# Patient Record
Sex: MALE | Race: WHITE | NOT HISPANIC OR LATINO | Employment: PART TIME | URBAN - METROPOLITAN AREA
[De-identification: names, ages, dates, MRNs, and addresses within clinical notes are randomized per-mention and may not be internally consistent; named-entity substitution may affect disease eponyms.]

---

## 2017-04-20 ENCOUNTER — APPOINTMENT (OUTPATIENT)
Dept: LAB | Facility: CLINIC | Age: 67
End: 2017-04-20
Payer: COMMERCIAL

## 2017-04-20 ENCOUNTER — TRANSCRIBE ORDERS (OUTPATIENT)
Dept: LAB | Facility: CLINIC | Age: 67
End: 2017-04-20

## 2017-04-20 DIAGNOSIS — Z13.29 SCREENING FOR THYROID DISORDER: ICD-10-CM

## 2017-04-20 DIAGNOSIS — Z13.6 SCREENING FOR ISCHEMIC HEART DISEASE: Primary | ICD-10-CM

## 2017-04-20 DIAGNOSIS — E78.2 MIXED HYPERLIPIDEMIA: ICD-10-CM

## 2017-04-20 DIAGNOSIS — Z12.5 SPECIAL SCREENING FOR MALIGNANT NEOPLASM OF PROSTATE: ICD-10-CM

## 2017-04-20 DIAGNOSIS — Z12.11 SPECIAL SCREENING FOR MALIGNANT NEOPLASMS, COLON: ICD-10-CM

## 2017-04-20 LAB
ALBUMIN SERPL BCP-MCNC: 3.3 G/DL (ref 3.5–5)
ALP SERPL-CCNC: 55 U/L (ref 46–116)
ALT SERPL W P-5'-P-CCNC: 31 U/L (ref 12–78)
ANION GAP SERPL CALCULATED.3IONS-SCNC: 5 MMOL/L (ref 4–13)
AST SERPL W P-5'-P-CCNC: 22 U/L (ref 5–45)
BASOPHILS # BLD AUTO: 0.04 THOUSANDS/ΜL (ref 0–0.1)
BASOPHILS NFR BLD AUTO: 1 % (ref 0–1)
BILIRUB SERPL-MCNC: 0.88 MG/DL (ref 0.2–1)
BUN SERPL-MCNC: 19 MG/DL (ref 5–25)
CALCIUM SERPL-MCNC: 9 MG/DL (ref 8.3–10.1)
CHLORIDE SERPL-SCNC: 106 MMOL/L (ref 100–108)
CHOLEST SERPL-MCNC: 161 MG/DL (ref 50–200)
CO2 SERPL-SCNC: 30 MMOL/L (ref 21–32)
CREAT SERPL-MCNC: 1.03 MG/DL (ref 0.6–1.3)
EOSINOPHIL # BLD AUTO: 0.42 THOUSAND/ΜL (ref 0–0.61)
EOSINOPHIL NFR BLD AUTO: 7 % (ref 0–6)
ERYTHROCYTE [DISTWIDTH] IN BLOOD BY AUTOMATED COUNT: 13.1 % (ref 11.6–15.1)
GFR SERPL CREATININE-BSD FRML MDRD: >60 ML/MIN/1.73SQ M
GLUCOSE P FAST SERPL-MCNC: 90 MG/DL (ref 65–99)
HCT VFR BLD AUTO: 41.1 % (ref 36.5–49.3)
HDLC SERPL-MCNC: 35 MG/DL (ref 40–60)
HGB BLD-MCNC: 14.1 G/DL (ref 12–17)
LDLC SERPL CALC-MCNC: 99 MG/DL (ref 0–100)
LYMPHOCYTES # BLD AUTO: 1.82 THOUSANDS/ΜL (ref 0.6–4.47)
LYMPHOCYTES NFR BLD AUTO: 30 % (ref 14–44)
MCH RBC QN AUTO: 32.8 PG (ref 26.8–34.3)
MCHC RBC AUTO-ENTMCNC: 34.3 G/DL (ref 31.4–37.4)
MCV RBC AUTO: 96 FL (ref 82–98)
MONOCYTES # BLD AUTO: 0.71 THOUSAND/ΜL (ref 0.17–1.22)
MONOCYTES NFR BLD AUTO: 12 % (ref 4–12)
NEUTROPHILS # BLD AUTO: 2.99 THOUSANDS/ΜL (ref 1.85–7.62)
NEUTS SEG NFR BLD AUTO: 50 % (ref 43–75)
NRBC BLD AUTO-RTO: 0 /100 WBCS
PLATELET # BLD AUTO: 171 THOUSANDS/UL (ref 149–390)
PMV BLD AUTO: 12.2 FL (ref 8.9–12.7)
POTASSIUM SERPL-SCNC: 4.4 MMOL/L (ref 3.5–5.3)
PROT SERPL-MCNC: 7.1 G/DL (ref 6.4–8.2)
PSA SERPL-MCNC: 1.7 NG/ML (ref 0–4)
RBC # BLD AUTO: 4.3 MILLION/UL (ref 3.88–5.62)
SODIUM SERPL-SCNC: 141 MMOL/L (ref 136–145)
TRIGL SERPL-MCNC: 133 MG/DL
WBC # BLD AUTO: 6 THOUSAND/UL (ref 4.31–10.16)

## 2017-04-20 PROCEDURE — G0103 PSA SCREENING: HCPCS

## 2017-04-20 PROCEDURE — 85025 COMPLETE CBC W/AUTO DIFF WBC: CPT

## 2017-04-20 PROCEDURE — 80061 LIPID PANEL: CPT

## 2017-04-20 PROCEDURE — 80053 COMPREHEN METABOLIC PANEL: CPT

## 2017-04-20 PROCEDURE — 36415 COLL VENOUS BLD VENIPUNCTURE: CPT

## 2017-04-24 ENCOUNTER — GENERIC CONVERSION - ENCOUNTER (OUTPATIENT)
Dept: OTHER | Facility: OTHER | Age: 67
End: 2017-04-24

## 2017-05-01 ENCOUNTER — ALLSCRIPTS OFFICE VISIT (OUTPATIENT)
Dept: OTHER | Facility: OTHER | Age: 67
End: 2017-05-01

## 2017-05-01 LAB — OCCULT BLD, FECAL IMMUNOLOGICAL (HISTORICAL): NEGATIVE

## 2017-08-31 ENCOUNTER — ALLSCRIPTS OFFICE VISIT (OUTPATIENT)
Dept: OTHER | Facility: OTHER | Age: 67
End: 2017-08-31

## 2018-01-10 NOTE — RESULT NOTES
Discussion/Summary   will discuss labs at follow  up appt     Verified Results  (1) CBC/PLT/DIFF 20Apr2017 07:15AM Tracy Contreras     Test Name Result Flag Reference   WBC COUNT 6 00 Thousand/uL  4 31-10 16   RBC COUNT 4 30 Million/uL  3 88-5 62   HEMOGLOBIN 14 1 g/dL  12 0-17 0   HEMATOCRIT 41 1 %  36 5-49 3   MCV 96 fL  82-98   MCH 32 8 pg  26 8-34 3   MCHC 34 3 g/dL  31 4-37 4   RDW 13 1 %  11 6-15 1   MPV 12 2 fL  8 9-12 7   PLATELET COUNT 167 Thousands/uL  149-390   nRBC AUTOMATED 0 /100 WBCs     NEUTROPHILS RELATIVE PERCENT 50 %  43-75   LYMPHOCYTES RELATIVE PERCENT 30 %  14-44   MONOCYTES RELATIVE PERCENT 12 %  4-12   EOSINOPHILS RELATIVE PERCENT 7 % H 0-6   BASOPHILS RELATIVE PERCENT 1 %  0-1   NEUTROPHILS ABSOLUTE COUNT 2 99 Thousands/? ??L  1 85-7 62   LYMPHOCYTES ABSOLUTE COUNT 1 82 Thousands/? ??L  0 60-4 47   MONOCYTES ABSOLUTE COUNT 0 71 Thousand/? ??L  0 17-1 22   EOSINOPHILS ABSOLUTE COUNT 0 42 Thousand/? ??L  0 00-0 61   BASOPHILS ABSOLUTE COUNT 0 04 Thousands/? ??L  0 00-0 10   This bloodwork is non-fasting  Please drink two glasses of water morning of  bloodwork  (1) COMPREHENSIVE METABOLIC PANEL [id number] 95:46FN Tracy Contreras     Test Name Result Flag Reference   SODIUM 141 mmol/L  136-145   POTASSIUM 4 4 mmol/L  3 5-5 3   Slightly Hemolyzed; Results May be Affected   CHLORIDE 106 mmol/L  100-108   CARBON DIOXIDE 30 mmol/L  21-32   ANION GAP (CALC) 5 mmol/L  4-13   BLOOD UREA NITROGEN 19 mg/dL  5-25   CREATININE 1 03 mg/dL  0 60-1 30   Standardized to IDMS reference method   CALCIUM 9 0 mg/dL  8 3-10 1   BILI, TOTAL 0 88 mg/dL  0 20-1 00   ALK PHOSPHATAS 55 U/L     ALT (SGPT) 31 U/L  12-78   AST(SGOT) 22 U/L  5-45   Slightly Hemolyzed;  Results May be Affected   ALBUMIN 3 3 g/dL L 3 5-5 0   TOTAL PROTEIN 7 1 g/dL  6 4-8 2   eGFR Non-African American      >60 0 ml/min/1 73sq m   [employer] Disease Education Program recommendations are as follows:  GFR calculation is accurate only with a steady state creatinine  Chronic Kidney disease less than 60 ml/min/1 73 sq  meters  Kidney failure less than 15 ml/min/1 73 sq  meters  GLUCOSE FASTING 90 mg/dL  65-99     (1) LIPID PANEL, FASTING 20Apr2017 07:15AM Bradley Rangel     Test Name Result Flag Reference   CHOLESTEROL 161 mg/dL     HDL,DIRECT 35 mg/dL L 40-60   Specimen collection should occur prior to Metamizole administration due to the potential for falsely depressed results  LDL CHOLESTEROL CALCULATED 99 mg/dL  0-100   This is a fasting blood test  Water,black tea or black  coffee only after 9:00pm the night before test  Drink 2 glasses of water the morning of test         Triglyceride:         Normal              <150 mg/dl       Borderline High    150-199 mg/dl       High               200-499 mg/dl       Very High          >499 mg/dl  Cholesterol:         Desirable        <200 mg/dl      Borderline High  200-239 mg/dl      High             >239 mg/dl  HDL Cholesterol:        High    >59 mg/dL      Low     <41 mg/dL  LDL CALCULATED:    This screening LDL is a calculated result  It does not have the accuracy of the Direct Measured LDL in the monitoring of patients with hyperlipidemia and/or statin therapy  Direct Measure LDL (MJU180) must be ordered separately in these patients  TRIGLYCERIDES 133 mg/dL  <=150   Specimen collection should occur prior to N-Acetylcysteine or Metamizole administration due to the potential for falsely depressed results  (1) PSA (SCREEN) (Dx V76 44 Screen for Prostate Cancer) 20Apr2017 07:15AM Bradley Rangel     Test Name Result Flag Reference   PROSTATE SPECIFIC ANTIGEN 1 7 ng/mL  0 0-4 0   American Urological Association Guidelines define biochemical recurrence of prostate cancer as a detectable or rising PSA value post-radical prostatectomy that is greater than or equal to 0 2 ng/mL with a second confirmatory level of greater than or equal to 0 2 ng/mL  This bloodwork is non-fasting   Please drink two glasses of water morning of  bloodwork

## 2018-01-10 NOTE — RESULT NOTES
Verified Results  (1) CBC/PLT/DIFF 12Apr2016 07:18AM [email or web address]     Test Name Result Flag Reference   WHITE BLOOD CELL COUNT 6 5 Thousand/uL  3 8-10 8   RED BLOOD CELL COUNT 4 61 Million/uL  4 20-5 80   HEMOGLOBIN 14 4 g/dL  13 2-17 1   HEMATOCRIT 45 0 %  38 5-50 0   MCV 97 6 fL  80 0-100 0   MCH 31 3 pg  27 0-33 0   MCHC 32 1 g/dL  32 0-36 0   RDW 13 1 %  11 0-15 0   PLATELET COUNT 837 Thousand/uL  140-400   MPV 11 0 fL  7 5-11 5   ABSOLUTE NEUTROPHILS 3543 cells/uL  8362-9829   ABSOLUTE LYMPHOCYTES 1755 cells/uL  850-3900   ABSOLUTE MONOCYTES 670 cells/uL  200-950   ABSOLUTE EOSINOPHILS 514 cells/uL H    ABSOLUTE BASOPHILS 20 cells/uL  0-200   NEUTROPHILS 54 5 %     LYMPHOCYTES 27 0 %     MONOCYTES 10 3 %     EOSINOPHILS 7 9 %     BASOPHILS 0 3 %       (1) COMPREHENSIVE METABOLIC PANEL [id number] 66:11KP [email or web address]     Test Name Result Flag Reference   GLUCOSE 95 mg/dL  65-99   Fasting reference interval   UREA NITROGEN (BUN) 21 mg/dL  7-25   CREATININE 1 02 mg/dL  0 70-1 25   For patients >52years of age, the reference limit  for Creatinine is approximately 13% higher for people  identified as -American  eGFR NON-AFR   AMERICAN 77 mL/min/1 73m2  > OR = 60   eGFR AFRICAN AMERICAN 90 mL/min/1 73m2  > OR = 60   BUN/CREATININE RATIO   1-02   NOT APPLICABLE (calc)   SODIUM 142 mmol/L  135-146   POTASSIUM 4 8 mmol/L  3 5-5 3   CHLORIDE 107 mmol/L     CARBON DIOXIDE 23 mmol/L  19-30   CALCIUM 9 1 mg/dL  8 6-10 3   PROTEIN, TOTAL 6 8 g/dL  6 1-8 1   ALBUMIN 4 2 g/dL  3 6-5 1   GLOBULIN 2 6 g/dL (calc)  1 9-3 7   ALBUMIN/GLOBULIN RATIO 1 6 (calc)  1 0-2 5   BILIRUBIN, TOTAL 0 6 mg/dL  0 2-1 2   ALKALINE PHOSPHATASE 49 U/L     AST 18 U/L  10-35   ALT 24 U/L  9-46     (1) LIPID PANEL, FASTING 12Apr2016 07:18AM [email or web address]     Test Name Result Flag Reference   CHOLESTEROL, TOTAL 141 mg/dL  125-200   HDL CHOLESTEROL 30 mg/dL L > OR = 40   TRIGLICERIDES 96 mg/dL  <505   LDL-CHOLESTEROL 92 mg/dL (calc)  <130   Desirable range <100 mg/dL for patients with CHD or  diabetes and <70 mg/dL for diabetic patients with  known heart disease  CHOL/HDLC RATIO 4 7 (calc)  < OR = 5 0   NON HDL CHOLESTEROL 111 mg/dL (calc)     Target for non-HDL cholesterol is 30 mg/dL higher than   LDL cholesterol target  (1) PSA (SCREEN) (Dx V76 44 Screen for Prostate Cancer) 12Apr2016 07:18AM [email or web address]     Test Name Result Flag Reference   PSA, TOTAL 1 7 ng/mL  < OR = 4 0   This test was performed using the Siemens  chemiluminescent method  Values obtained from  different assay methods cannot be used  interchangeably  PSA levels, regardless of  value, should not be interpreted as absolute  evidence of the presence or absence of disease       (Q) TSH, 3RD GENERATION 12Apr2016 07:18AM [email or web address]     Test Name Result Flag Reference   TSH 1 92 mIU/L  0 40-4 50       Discussion/Summary   labs acceptable

## 2018-01-13 VITALS
SYSTOLIC BLOOD PRESSURE: 136 MMHG | HEIGHT: 69 IN | DIASTOLIC BLOOD PRESSURE: 84 MMHG | BODY MASS INDEX: 26.96 KG/M2 | TEMPERATURE: 97 F | HEART RATE: 76 BPM | RESPIRATION RATE: 16 BRPM | WEIGHT: 182 LBS

## 2018-01-14 VITALS
BODY MASS INDEX: 28.88 KG/M2 | DIASTOLIC BLOOD PRESSURE: 74 MMHG | SYSTOLIC BLOOD PRESSURE: 114 MMHG | TEMPERATURE: 97 F | HEIGHT: 69 IN | RESPIRATION RATE: 16 BRPM | WEIGHT: 195 LBS | HEART RATE: 76 BPM

## 2018-01-16 NOTE — PROGRESS NOTES
Assessment    1  Encounter for preventive health examination (V70 0) (Z00 00)   2  Encounter for screening for cardiovascular disorders (V81 2) (Z13 6)   3  Encounter for screening for malignant neoplasm of prostate (V76 44) (Z12 5)   4  Screening for hypothyroidism (V77 0) (Z13 29)   5  Encounter for screening for malignant neoplasm of colon (V76 51) (Z12 11)   6  Asthma (493 90) (J45 909)   7  Acid reflux disease (530 81) (K21 9)   8  Mixed hyperlipidemia (272 2) (E78 2)   9  BMI 28 0-28 9,adult (V85 24) (Z68 28)    Plan  Acid reflux disease    · Omeprazole 40 MG Oral Capsule Delayed Release; take 1 capsule by mouth  once daily  Asthma    · ProAir  (90 Base) MCG/ACT Inhalation Aerosol Solution; INHALE 1-2  PUFFS EVERY 4-6 HOURS AS NEEDED AND AS DIRECTED  Encounter for screening for cardiovascular disorders, Encounter for screening for  malignant neoplasm of prostate, Screening for hypothyroidism    · (1) CBC/PLT/DIFF; Status:Active; Requested for:07Apr2016;    · (1) COMPREHENSIVE METABOLIC PANEL; Status:Active; Requested for:07Apr2016;    · (1) LIPID PANEL, FASTING; Status:Active; Requested for:07Apr2016;    · (1) PSA (SCREEN) (Dx V76 44 Screen for Prostate Cancer); Status:Active; Requested  for:07Apr2016;    · (1) TSH; Status:Active; Requested for:07Apr2016;   Encounter for screening for malignant neoplasm of colon    · COLONOSCOPY; Status:Active; Requested for:07Apr2016;    · 1 - Mahad King MD, Charmaine Alaniz (Gastroenterology) Physician Referral  Consult  Status: Active   Requested for: 07Apr2016  Care Summary provided  : Yes  Health Maintenance    · DIGITAL RECTAL EXAM; Status:Complete;   Done: 62WNC9801 11:25AM   · Hemoccult Screening - POC; Status:Complete;   Done: 98APR6333 11:25AM   · Call [phone] if: You have any warning signs of skin cancer ; Status:Complete;    Done: 26VYE8483 11:29AM   · Call 911 if:  You experience a new kind of chest pain (angina) or pressure ;  Status:Complete;   Done: 39UII4064 11:29AM   · Begin a limited exercise program ; Status:Complete;   Done: 10RBZ8284 11:29AM   · Decreasing the stress in your life may help your condition improve ; Status:Complete;    Done: 07Apr2016 11:29AM   · Diets that are low in carbohydrates and high in protein are very popular for weight loss ;  Status:Complete;   Done: 78Zwq4346 11:29AM   · Regular aerobic exercise can help reduce stress ; Status:Complete;   Done: 04MMI7168  11:29AM   · The plan of care for falls is detailed in the plan and/or discussion section of today's note ;  Status:Complete;   Done: 70QVG1673 11:29AM   · The plan of care for urinary incontinence is detailed in the plan and/or discussion section  of today's note ; Status:Complete;   Done: 90MLW4313 11:29AM   · There are many exercise options for seniors ; Status:Complete;   Done: 61SWA7717  11:29AM   · There ways to avoid falling ; Status:Complete;   Done: 07Apr2016 11:29AM   · These are things you can do to prevent falls in and around the home ; Status:Complete;    Done: 15Iam5971 11:29AM  Mixed hyperlipidemia    · Lipitor 20 MG Oral Tablet (Atorvastatin Calcium); Take 1 tablet daily  Unlinked    · Aspirin 81 MG Oral Tablet; Take 1 tablet daily    Discussion/Summary  Screening lab work includes glucose and lipid profile  Chief Complaint  cpe      History of Present Illness  HM, Adult Male: The patient is being seen for a health maintenance evaluation  General Health:   Screening:   HPI: pt is here for a full physical         Review of Systems    Constitutional: No fever or chills, feels well, no tiredness, no recent weight gain or weight loss  Eyes: No complaints of eye pain, no red eyes, no discharge from eyes, no itchy eyes  ENT: no complaints of earache, no hearing loss, no nosebleeds, no nasal discharge, no sore throat, no hoarseness     Cardiovascular: No complaints of slow heart rate, no fast heart rate, no chest pain, no palpitations, no leg claudication, no lower extremity  Respiratory: No complaints of shortness of breath, no wheezing, no cough, no SOB on exertion, no orthopnea or PND  Gastrointestinal: No complaints of abdominal pain, no constipation, no nausea or vomiting, no diarrhea or bloody stools  Genitourinary: No complaints of dysuria, no incontinence, no hesitancy, no nocturia, no genital lesion, no testicular pain  Musculoskeletal: No complaints of arthralgia, no myalgias, no joint swelling or stiffness, no limb pain or swelling  Integumentary: No complaints of skin rash or skin lesions, no itching, no skin wound, no dry skin  Neurological: No compliants of headache, no confusion, no convulsions, no numbness or tingling, no dizziness or fainting, no limb weakness, no difficulty walking  Psychiatric: Is not suicidal, no sleep disturbances, no anxiety or depression, no change in personality, no emotional problems  Endocrine: No complaints of proptosis, no hot flashes, no muscle weakness, no erectile dysfunction, no deepening of the voice, no feelings of weakness  Hematologic/Lymphatic: No complaints of swollen glands, no swollen glands in the neck, does not bleed easily, no easy bruising  Active Problems    1  Acid reflux disease (530 81) (K21 9)   2  Allergic rhinitis (477 9) (J30 9)   3  Asthma (493 90) (J45 909)   4  Encounter for screening for malignant neoplasm of colon (V76 51) (Z12 11)   5   Mixed hyperlipidemia (272 2) (E78 2)    Past Medical History    · History of acute sinusitis (V12 69) (Z87 09)   · 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)    Surgical History    · History of Surgery Scrotum Excision Of Lesion    Family History    · Family history of cerebrovascular accident (V17 1) (Z82 3)   · Family history of tuberculosis (V18 8) (Z83 1)   · Family history of Throat cancer   · Family history of Tobacco abuse    · Family history of Angina at rest   · Family history of Emphysema/COPD   · Family history of Tobacco abuse    Social History    · Former smoker (B29 21) (M77 484)    Current Meds   1  Advil 200 MG Oral Tablet; Take 1 tablet 3-4 times daily as needed Recorded   2  Aspirin 81 MG Oral Tablet; Take 1 tablet daily Recorded   3  Claritin 10 MG Oral Tablet; TAKE 1 TABLET EVERY MORNING AS NEEDED; Therapy: (Recorded:31Mar2015) to Recorded   4  Fish Oil 1000 MG Oral Capsule; 1 TAB THREE TIMES DAILY; Therapy: (Recorded:31Mar2015) to Recorded   5  Lipitor 20 MG Oral Tablet; Take 1 tablet daily; Therapy: (Recorded:07Apr2016) to Recorded   6  Omeprazole 40 MG Oral Capsule Delayed Release; take 1 capsule by mouth once daily; Therapy: 84DOU9176 to (Evaluate:22Jsp1574)  Requested for: 92JMX2560; Last   Rx:02Nov2015 Ordered   7  ProAir  (90 Base) MCG/ACT Inhalation Aerosol Solution; INHALE 1-2 PUFFS   EVERY 4-6 HOURS AS NEEDED AND AS DIRECTED; Therapy: 48ZTZ7501 to (Evaluate:30Apr2015)  Requested for: 89VDT5638; Last   Rx:31Mar2015 Ordered    Allergies    1  No Known Drug Allergies    Vitals   Recorded: 07Apr2016 11:00AM   Temperature 96 7 F   Heart Rate 84   Respiration 16   Systolic 024   Diastolic 84   Height 5 ft 9 in   Weight 193 lb    BMI Calculated 28 5   BSA Calculated 2 03     Physical Exam    Constitutional   General appearance: No acute distress, well appearing and well nourished  Eyes   Conjunctiva and lids: No erythema, swelling or discharge  Pupils and irises: Equal, round, reactive to light  Ophthalmoscopic examination: Normal fundi and optic discs  Ears, Nose, Mouth, and Throat   External inspection of ears and nose: Normal     Otoscopic examination: Tympanic membranes translucent with normal light reflex  Canals patent without erythema  Hearing: Normal     Nasal mucosa, septum, and turbinates: Normal without edema or erythema  Lips, teeth, and gums: Normal, good dentition  Oropharynx: Normal with no erythema, edema, exudate or lesions      Neck   Neck: Supple, symmetric, trachea midline, no masses  Thyroid: Normal, no thyromegaly  Pulmonary   Respiratory effort: No increased work of breathing or signs of respiratory distress  Percussion of chest: Normal     Palpation of chest: Normal     Auscultation of lungs: Clear to auscultation  Cardiovascular   Palpation of heart: Normal PMI, no thrills  Auscultation of heart: Normal rate and rhythm, normal S1 and S2, no murmurs  Carotid pulses: 2+ bilaterally  Abdominal aorta: Normal     Femoral pulses: 2+ bilaterally  Pedal pulses: 2+ bilaterally  Examination of extremities for edema and/or varicosities: Normal     Chest   Breasts: Normal, no dimpling or skin changes appreciated  Palpation of breasts and axillae: Normal, no masses palpated  Chest: Normal     Abdomen   Abdomen: Non-tender, no masses  Liver and spleen: No hepatomegaly or splenomegaly  Examination for hernias: No hernias appreciated  Anus, perineum, and rectum: Normal sphincter tone, no masses, no prolapse  Stool sample for occult blood: Negative  Genitourinary   Scrotal contents: Normal testes, no masses  Penis: Normal, no lesions  Digital rectal exam of prostate: Normal size, no masses  Lymphatic   Palpation of lymph nodes in neck: No lymphadenopathy  Palpation of lymph nodes in axillae: No lymphadenopathy  Palpation of lymph nodes in groin: No lymphadenopathy  Palpation of lymph nodes in other areas: No lymphadenopathy  Musculoskeletal   Gait and station: Normal     Inspection/palpation of digits and nails: Normal without clubbing or cyanosis  Inspection/palpation of joints, bones, and muscles: Normal     Range of motion: Normal     Stability: Normal     Muscle strength/tone: Normal     Skin   Skin and subcutaneous tissue: Normal without rashes or lesions  Palpation of skin and subcutaneous tissue: Normal turgor  Neurologic   Cranial nerves: Cranial nerves 2-12 intact      Reflexes: 2+ and symmetric  Sensation: No sensory loss  Psychiatric   Judgment and insight: Normal     Orientation to person, place and time: Normal     Recent and remote memory: Intact  Mood and affect: Normal        Procedure    Procedure: Indication: routine screening  Pt forgot glasses   Color vision was and the results were normal       Signatures   Electronically signed by : Bob Kim DO;  Apr 7 2016 11:30AM EST                       (Author)

## 2018-01-16 NOTE — PROGRESS NOTES
Assessment    1  Encounter for preventive health examination (V70 0) (Z00 00)   2  Screening for colorectal cancer (V76 51) (Z12 11,Z12 12)   3  GERD without esophagitis (530 81) (K21 9)   4  Mixed hyperlipidemia (272 2) (E78 2)   5  Former smoker (V15 82) (K43 184)    Plan  Congenital pes planus of right foot    · Lyrica 75 MG Oral Capsule   · Meloxicam 7 5 MG Oral Tablet  GERD without esophagitis    · Omeprazole 40 MG Oral Capsule Delayed Release; TAKE 1 [address] [facility]    · DIGITAL RECTAL EXAM; Status:Complete;   Done: 67GNI3131 03:59PM   · Hemoccult Screening - POC; Status:Complete;   Done: 94SMK4545 03:59PM  Mixed hyperlipidemia    · Atorvastatin Calcium 20 MG Oral Tablet (Lipitor); Take 1 tablet daily   · Fish Oil 1000 MG Oral Capsule; 1 TAB THREE TIMES DAILY   · (1) COMPREHENSIVE METABOLIC PANEL; Status:Active; Requested for:01Nov2017;    · (1) LIPID PANEL, FASTING; Status:Active; Requested for:01Nov2017;    · Follow-up visit in 6 months Evaluation and Treatment  Follow-up  Status: Hold For -  Scheduling  Requested for: 13ELE1143  PMH: History of asthma    · ProAir  (90 Base) MCG/ACT Inhalation Aerosol Solution  PMH: Right foot pain    · Gabapentin 300 MG Oral Capsule  PMH: Screening for genitourinary condition    · *VB - Urinary Incontinence Screen (Dx Z13 89 Screen for UI); Status:Complete;   Done:  05SKQ0698 03:40PM  Screening for colorectal cancer    · (1) COLOGUARD; Status:Active; Requested for:30Bmn7938;   cont  : I authorize [address] to obtain reimbursement for      Cologuard and to directly contact and collect a second sample from the [facility]      if reportable results are not obtained from the initial sample   cont  : I accept responsibility for maintaining the privacy of test results and      related information as required by HIPAA   : By ordering Cologuard, I certify that I am a licensed medical professional      authorized to order Cologuard   I acknowledge that the test is medically necessary and      that the patient is eligible to use Cologuard  Unlinked    · Aspirin 81 MG TABS; Take 1 tablet daily    Discussion/Summary  Impression: health maintenance visit  Prostate cancer screening: prostate cancer screening is current  Testicular cancer screening: clinical testicular exam was done today  Colorectal cancer screening: cologuard ordered  Chief Complaint  CPE,      History of Present Illness  HM, Adult Male: The patient is being seen for a health maintenance evaluation  General Health:   Screening:   HPI: pt is here for a full physical    pt states his rt foot bothers him sees podiatry has a pes planus and some arthritis    last colon was 12 years ago    pt has a history of asthma but it has not bothered him      Review of Systems    Constitutional: No fever or chills, feels well, no tiredness, no recent weight gain or weight loss  Eyes: No complaints of eye pain, no red eyes, no discharge from eyes, no itchy eyes  ENT: no complaints of earache, no hearing loss, no nosebleeds, no nasal discharge, no sore throat, no hoarseness  Cardiovascular: No complaints of slow heart rate, no fast heart rate, no chest pain, no palpitations, no leg claudication, no lower extremity  Respiratory: No complaints of shortness of breath, no wheezing, no cough, no SOB on exertion, no orthopnea or PND  Gastrointestinal: No complaints of abdominal pain, no constipation, no nausea or vomiting, no diarrhea or bloody stools  Genitourinary: No complaints of dysuria, no incontinence, no hesitancy, no nocturia, no genital lesion, no testicular pain  Musculoskeletal: No complaints of arthralgia, no myalgias, no joint swelling or stiffness, no limb pain or swelling  Integumentary: No complaints of skin rash or skin lesions, no itching, no skin wound, no dry skin     Neurological: No compliants of headache, no confusion, no convulsions, no numbness or tingling, no dizziness or fainting, no limb weakness, no difficulty walking  Psychiatric: Is not suicidal, no sleep disturbances, no anxiety or depression, no change in personality, no emotional problems  Endocrine: No complaints of proptosis, no hot flashes, no muscle weakness, no erectile dysfunction, no deepening of the voice, no feelings of weakness  Hematologic/Lymphatic: No complaints of swollen glands, no swollen glands in the neck, does not bleed easily, no easy bruising  Active Problems    1  BMI 28 0-28 9,adult (V85 24) (Z68 28)   2  Congenital pes planus of left foot (754 61) (Q66 52)   3  Congenital pes planus of right foot (754 61) (Q66 51)   4  GERD without esophagitis (530 81) (K21 9)   5  Lumbosacral radiculopathy (724 4) (M54 17)   6  Mixed hyperlipidemia (272 2) (E78 2)    Past Medical History    · History of acute sinusitis (V12 69) (Z87 09)   · 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)    Surgical History    · History of Surgery Scrotum Excision Of Lesion    Family History  Mother    · Family history of cerebrovascular accident (V17 1) (Z82 3)   · Family history of tuberculosis (V18 8) (Z83 1)   · Family history of Throat cancer   · Family history of Tobacco abuse  Father    · Family history of Angina at rest   · Family history of Emphysema/COPD   · Family history of Tobacco abuse    Social History    · Former smoker (G39 11) (Y24 120)    Current Meds   1  Aleve CAPS; Therapy: (Recorded:30Jle7703) to Recorded   2  Aspirin 81 MG TABS; Take 1 tablet daily Recorded   3  Claritin 10 MG Oral Tablet; TAKE 1 TABLET EVERY MORNING AS NEEDED; Therapy: (Recorded:31Mar2015) to Recorded   4  Fish Oil 1000 MG Oral Capsule; 1 TAB THREE TIMES DAILY; Therapy: (Recorded:17Xga1937) to Recorded   5  Gabapentin 300 MG Oral Capsule; TAKE 1 CAPSULE AT BEDTIME;    Therapy: 28RUK4691 to (Evaluate:49Nlp4640)  Requested for: 11KDA6485; Last   Rx:16Nov2016; Status: ACTIVE - Transmit to Pharmacy - Awaiting Verification Ordered   6  Gabapentin 300 MG Oral Capsule; TAKE 1 CAPSULE AT BEDTIME; Therapy: 42CCY9069 to (Evaluate:23Mar2017)  Requested for: 72Pyz3019; Last   Rx:89Pwz0245 Ordered   7  Lipitor 20 MG Oral Tablet; Take 1 tablet daily; Therapy: (Recorded:07Apr2016) to Recorded   8  Lyrica 75 MG Oral Capsule; TAKE 1 CAPSULE TWICE DAILY; Therapy: 02LCW3628 to (Evaluate:51Fzb0426); Last Rx:37Tkh8277 Ordered   9  Meloxicam 7 5 MG Oral Tablet; TAKE 1 TABLET DAILY; Therapy: 12GWH5723 to (Evaluate:13Nov2016)  Requested for: 45Rkz4775; Last   Rx:82Hqw9933 Ordered   10  Multiple Vitamin CAPS; Therapy: (Recorded:50Mng0845) to Recorded   11  Omeprazole 40 MG Oral Capsule Delayed Release; TAKE 1 CAPSULE ONCE DAILY; Therapy: 61GGB0520 to (Evaluate:17May2017)  Requested for: 49QNK6833; Last    Rx:18Nov2016 Ordered   12  ProAir  (90 Base) MCG/ACT Inhalation Aerosol Solution; INHALE 1-2 PUFFS    EVERY 4-6 HOURS AS NEEDED AND AS DIRECTED; Therapy: 47PDI9569 to (Evaluate:30Apr2015)  Requested for: 07Apr2016; Last    Rx:31Mar2015 Ordered   13  Turmeric Curcumin Oral Capsule; Therapy: (Recorded:33Ran9330) to Recorded    Allergies    1  No Known Drug Allergies    Vitals   Recorded: 96OYD8012 03:33PM   Temperature 97 F   Heart Rate 76   Respiration 16   Systolic 602   Diastolic 74   Height 5 ft 9 in   Weight 195 lb    BMI Calculated 28 8   BSA Calculated 2 04     Physical Exam    Constitutional   General appearance: No acute distress, well appearing and well nourished  Head and Face   Head and face: Normal     Palpation of the face and sinuses: No sinus tenderness  Eyes   Conjunctiva and lids: No erythema, swelling or discharge  Pupils and irises: Equal, round, reactive to light  Ophthalmoscopic examination: Normal fundi and optic discs      Ears, Nose, Mouth, and Throat   External inspection of ears and nose: Normal     Otoscopic examination: Tympanic membranes translucent with normal light reflex  Canals patent without erythema  Hearing: Normal     Nasal mucosa, septum, and turbinates: Normal without edema or erythema  Lips, teeth, and gums: Normal, good dentition  Oropharynx: Normal with no erythema, edema, exudate or lesions  Neck   Neck: Supple, symmetric, trachea midline, no masses  Thyroid: Normal, no thyromegaly  Pulmonary   Respiratory effort: No increased work of breathing or signs of respiratory distress  Percussion of chest: Normal     Cardiovascular   Palpation of heart: Normal PMI, no thrills  Auscultation of heart: Normal rate and rhythm, normal S1 and S2, no murmurs  Abdomen   Abdomen: Non-tender, no masses  Liver and spleen: No hepatomegaly or splenomegaly  Examination for hernias: No hernias appreciated  Anus, perineum, and rectum: Normal sphincter tone, no masses, no prolapse  Stool sample for occult blood: Negative  Genitourinary   Scrotal contents: Normal testes, no masses  Penis: Normal, no lesions  Digital rectal exam of prostate: Normal size, no masses  Lymphatic   Palpation of lymph nodes in neck: No lymphadenopathy  Palpation of lymph nodes in axillae: No lymphadenopathy  Palpation of lymph nodes in groin: No lymphadenopathy  Palpation of lymph nodes in other areas: No lymphadenopathy  Musculoskeletal   Gait and station: Normal     Inspection/palpation of digits and nails: Normal without clubbing or cyanosis  Inspection/palpation of joints, bones, and muscles: Normal     Range of motion: Normal     Stability: Normal     Skin   Skin and subcutaneous tissue: Normal without rashes or lesions  Neurologic   Cranial nerves: Cranial nerves 2-12 intact  Cortical function: Normal mental status  Reflexes: 2+ and symmetric         Results/Data  *VB - Urinary Incontinence Screen (Dx Z13 89 Screen for UI) 44CQP2592 03:40PM Marcus Mohan     Test Name Result Flag Reference   Urinary Incontinence Assessment 57ZED2566       PHQ-2 Adult Depression Screening 82QWW7176 03:38PM User, Michaels     Test Name Result Flag Reference   PHQ-2 Adult Depression Score 0     Over the last two weeks, how often have you been bothered by any of the following problems? Little interest or pleasure in doing things: Not at all - 0  Feeling down, depressed, or hopeless: Not at all - 0   PHQ-2 Adult Depression Screening Negative       *VB - Urinary Incontinence Screen (Dx Z13 89 Screen for UI) 96QPE6526 03:40PM Everardo Freud     Test Name Result Flag Reference   Urinary Incontinence Assessment 92DPG6217       PHQ-2 Adult Depression Screening 36UZJ0083 03:38PM User, Ahs     Test Name Result Flag Reference   PHQ-2 Adult Depression Score 0     Over the last two weeks, how often have you been bothered by any of the following problems? Little interest or pleasure in doing things: Not at all - 0  Feeling down, depressed, or hopeless: Not at all - 0   PHQ-2 Adult Depression Screening Negative       (1) CBC/PLT/DIFF 20Apr2017 07:15AM Everardo Freud     Test Name Result Flag Reference   WBC COUNT 6 00 Thousand/uL  4 31-10 16   RBC COUNT 4 30 Million/uL  3 88-5 62   HEMOGLOBIN 14 1 g/dL  12 0-17 0   HEMATOCRIT 41 1 %  36 5-49 3   MCV 96 fL  82-98   MCH 32 8 pg  26 8-34 3   MCHC 34 3 g/dL  31 4-37 4   RDW 13 1 %  11 6-15 1   MPV 12 2 fL  8 9-12 7   PLATELET COUNT 263 Thousands/uL  149-390   nRBC AUTOMATED 0 /100 WBCs     NEUTROPHILS RELATIVE PERCENT 50 %  43-75   LYMPHOCYTES RELATIVE PERCENT 30 %  14-44   MONOCYTES RELATIVE PERCENT 12 %  4-12   EOSINOPHILS RELATIVE PERCENT 7 % H 0-6   BASOPHILS RELATIVE PERCENT 1 %  0-1   NEUTROPHILS ABSOLUTE COUNT 2 99 Thousands/? ??L  1 85-7 62   LYMPHOCYTES ABSOLUTE COUNT 1 82 Thousands/? ??L  0 60-4 47   MONOCYTES ABSOLUTE COUNT 0 71 Thousand/? ??L  0 17-1 22   EOSINOPHILS ABSOLUTE COUNT 0 42 Thousand/? ??L  0 00-0 61   BASOPHILS ABSOLUTE COUNT 0 04 Thousands/? ??L  0 00-0 10   This bloodwork is non-fasting  Please drink two glasses of water morning of  bloodwork  (1) COMPREHENSIVE METABOLIC PANEL 89MWG0419 74:34CO Trena Garrett     Test Name Result Flag Reference   SODIUM 141 mmol/L  136-145   POTASSIUM 4 4 mmol/L  3 5-5 3   Slightly Hemolyzed; Results May be Affected   CHLORIDE 106 mmol/L  100-108   CARBON DIOXIDE 30 mmol/L  21-32   ANION GAP (CALC) 5 mmol/L  4-13   BLOOD UREA NITROGEN 19 mg/dL  5-25   CREATININE 1 03 mg/dL  0 60-1 30   Standardized to IDMS reference method   CALCIUM 9 0 mg/dL  8 3-10 1   BILI, TOTAL 0 88 mg/dL  0 20-1 00   ALK PHOSPHATAS 55 U/L     ALT (SGPT) 31 U/L  12-78   AST(SGOT) 22 U/L  5-45   Slightly Hemolyzed; Results May be Affected   ALBUMIN 3 3 g/dL L 3 5-5 0   TOTAL PROTEIN 7 1 g/dL  6 4-8 2   eGFR Non-African American      >60 0 ml/min/1 73sq [facility] Disease Education Program recommendations are as follows:  GFR calculation is accurate only with a steady state creatinine  Chronic Kidney disease less than 60 ml/min/1 73 sq  meters  Kidney failure less than 15 ml/min/1 73 sq  meters  GLUCOSE FASTING 90 mg/dL  65-99     (1) LIPID PANEL, FASTING 20Apr2017 07:15AM Trena Garrett     Test Name Result Flag Reference   CHOLESTEROL 161 mg/dL     HDL,DIRECT 35 mg/dL L 40-60   Specimen collection should occur prior to Metamizole administration due to the potential for falsely depressed results     LDL CHOLESTEROL CALCULATED 99 mg/dL  0-100   This is a fasting blood test  Water,black tea or black  coffee only after 9:00pm the night before test  Drink 2 glasses of water the morning of test         Triglyceride:         Normal              <150 mg/dl       Borderline High    150-199 mg/dl       High               200-499 mg/dl       Very High          >499 mg/dl  Cholesterol:         Desirable        <200 mg/dl      Borderline High  200-239 mg/dl      High             >239 mg/dl  HDL Cholesterol:        High    >59 mg/dL      Low     <41 mg/dL  LDL CALCULATED:    This screening LDL is a calculated result  It does not have the accuracy of the Direct Measured LDL in the monitoring of patients with hyperlipidemia and/or statin therapy  Direct Measure LDL (RQV182) must be ordered separately in these patients  TRIGLYCERIDES 133 mg/dL  <=150   Specimen collection should occur prior to N-Acetylcysteine or Metamizole administration due to the potential for falsely depressed results  (1) PSA (SCREEN) (Dx V76 44 Screen for Prostate Cancer) 20Apr2017 07:15AM Marcus Mohan     Test Name Result Flag Reference   PROSTATE SPECIFIC ANTIGEN 1 7 ng/mL  0 0-4 0   American Urological Association Guidelines define biochemical recurrence of prostate cancer as a detectable or rising PSA value post-radical prostatectomy that is greater than or equal to 0 2 ng/mL with a second confirmatory level of greater than or equal to 0 2 ng/mL  This bloodwork is non-fasting  Please drink two glasses of water morning of  bloodwork         Procedure    Procedure: Forgot glasses      Signatures   Electronically signed by : Brie Hyde DO; May  1 2017  4:03PM EST                       (Author)

## 2018-02-27 DIAGNOSIS — K21.9 GASTROESOPHAGEAL REFLUX DISEASE, ESOPHAGITIS PRESENCE NOT SPECIFIED: Primary | ICD-10-CM

## 2018-02-27 RX ORDER — OMEPRAZOLE 40 MG/1
1 CAPSULE, DELAYED RELEASE ORAL DAILY
COMMUNITY
Start: 2015-03-31 | End: 2018-02-27 | Stop reason: SDUPTHER

## 2018-02-27 RX ORDER — OMEPRAZOLE 40 MG/1
40 CAPSULE, DELAYED RELEASE ORAL DAILY
Qty: 90 CAPSULE | Refills: 1 | Status: SHIPPED | OUTPATIENT
Start: 2018-02-27 | End: 2018-05-18 | Stop reason: SDUPTHER

## 2018-03-16 DIAGNOSIS — E78.2 MIXED HYPERLIPIDEMIA: Primary | ICD-10-CM

## 2018-03-16 RX ORDER — ATORVASTATIN CALCIUM 20 MG/1
TABLET, FILM COATED ORAL
Qty: 90 TABLET | Refills: 1 | Status: SHIPPED | OUTPATIENT
Start: 2018-03-16 | End: 2018-05-18 | Stop reason: SDUPTHER

## 2018-04-24 ENCOUNTER — TELEPHONE (OUTPATIENT)
Dept: FAMILY MEDICINE CLINIC | Facility: CLINIC | Age: 68
End: 2018-04-24

## 2018-04-24 DIAGNOSIS — Z12.5 SCREENING FOR PROSTATE CANCER: Primary | ICD-10-CM

## 2018-04-24 DIAGNOSIS — Z13.6 SCREENING FOR CARDIOVASCULAR CONDITION: ICD-10-CM

## 2018-04-24 NOTE — TELEPHONE ENCOUNTER
Left message on machine order is up front for   No further action needed   [facility] [place], [place]

## 2018-04-24 NOTE — TELEPHONE ENCOUNTER
[facility] EMERGENCY [facility]   Patient needs lab work for an upcomming cpe in [facility] lab in [facility]

## 2018-05-15 ENCOUNTER — TRANSCRIBE ORDERS (OUTPATIENT)
Dept: LAB | Facility: CLINIC | Age: 68
End: 2018-05-15

## 2018-05-15 ENCOUNTER — APPOINTMENT (OUTPATIENT)
Dept: LAB | Facility: CLINIC | Age: 68
End: 2018-05-15
Payer: COMMERCIAL

## 2018-05-15 DIAGNOSIS — Z13.6 SCREENING FOR CARDIOVASCULAR CONDITION: ICD-10-CM

## 2018-05-15 DIAGNOSIS — Z12.5 SCREENING FOR PROSTATE CANCER: ICD-10-CM

## 2018-05-15 LAB
ALBUMIN SERPL BCP-MCNC: 3.6 G/DL (ref 3.5–5)
ALP SERPL-CCNC: 57 U/L (ref 46–116)
ALT SERPL W P-5'-P-CCNC: 28 U/L (ref 12–78)
ANION GAP SERPL CALCULATED.3IONS-SCNC: 5 MMOL/L (ref 4–13)
AST SERPL W P-5'-P-CCNC: 21 U/L (ref 5–45)
BASOPHILS # BLD AUTO: 0.04 THOUSANDS/ΜL (ref 0–0.1)
BASOPHILS NFR BLD AUTO: 1 % (ref 0–1)
BILIRUB SERPL-MCNC: 0.75 MG/DL (ref 0.2–1)
BUN SERPL-MCNC: 22 MG/DL (ref 5–25)
CALCIUM SERPL-MCNC: 9.1 MG/DL (ref 8.3–10.1)
CHLORIDE SERPL-SCNC: 109 MMOL/L (ref 100–108)
CHOLEST SERPL-MCNC: 144 MG/DL (ref 50–200)
CO2 SERPL-SCNC: 27 MMOL/L (ref 21–32)
CREAT SERPL-MCNC: 0.99 MG/DL (ref 0.6–1.3)
EOSINOPHIL # BLD AUTO: 0.47 THOUSAND/ΜL (ref 0–0.61)
EOSINOPHIL NFR BLD AUTO: 7 % (ref 0–6)
ERYTHROCYTE [DISTWIDTH] IN BLOOD BY AUTOMATED COUNT: 13.4 % (ref 11.6–15.1)
GFR SERPL CREATININE-BSD FRML MDRD: 79 ML/MIN/1.73SQ M
GLUCOSE P FAST SERPL-MCNC: 84 MG/DL (ref 65–99)
HCT VFR BLD AUTO: 43.2 % (ref 36.5–49.3)
HDLC SERPL-MCNC: 36 MG/DL (ref 40–60)
HGB BLD-MCNC: 14.5 G/DL (ref 12–17)
IMM GRANULOCYTES # BLD AUTO: 0.04 THOUSAND/UL (ref 0–0.2)
IMM GRANULOCYTES NFR BLD AUTO: 1 % (ref 0–2)
LDLC SERPL CALC-MCNC: 91 MG/DL (ref 0–100)
LYMPHOCYTES # BLD AUTO: 2.01 THOUSANDS/ΜL (ref 0.6–4.47)
LYMPHOCYTES NFR BLD AUTO: 31 % (ref 14–44)
MCH RBC QN AUTO: 32.8 PG (ref 26.8–34.3)
MCHC RBC AUTO-ENTMCNC: 33.6 G/DL (ref 31.4–37.4)
MCV RBC AUTO: 98 FL (ref 82–98)
MONOCYTES # BLD AUTO: 0.95 THOUSAND/ΜL (ref 0.17–1.22)
MONOCYTES NFR BLD AUTO: 15 % (ref 4–12)
NEUTROPHILS # BLD AUTO: 2.99 THOUSANDS/ΜL (ref 1.85–7.62)
NEUTS SEG NFR BLD AUTO: 46 % (ref 43–75)
NRBC BLD AUTO-RTO: 0 /100 WBCS
PLATELET # BLD AUTO: 173 THOUSANDS/UL (ref 149–390)
PMV BLD AUTO: 11.7 FL (ref 8.9–12.7)
POTASSIUM SERPL-SCNC: 4.2 MMOL/L (ref 3.5–5.3)
PROT SERPL-MCNC: 7.2 G/DL (ref 6.4–8.2)
RBC # BLD AUTO: 4.42 MILLION/UL (ref 3.88–5.62)
SODIUM SERPL-SCNC: 141 MMOL/L (ref 136–145)
TRIGL SERPL-MCNC: 84 MG/DL
WBC # BLD AUTO: 6.5 THOUSAND/UL (ref 4.31–10.16)

## 2018-05-15 PROCEDURE — 80053 COMPREHEN METABOLIC PANEL: CPT

## 2018-05-15 PROCEDURE — 85025 COMPLETE CBC W/AUTO DIFF WBC: CPT

## 2018-05-15 PROCEDURE — 84153 ASSAY OF PSA TOTAL: CPT

## 2018-05-15 PROCEDURE — 80061 LIPID PANEL: CPT

## 2018-05-15 PROCEDURE — 36415 COLL VENOUS BLD VENIPUNCTURE: CPT

## 2018-05-16 LAB — PSA SERPL DL<=0.01 NG/ML-MCNC: 2.26 NG/ML (ref 0–4)

## 2018-05-18 ENCOUNTER — OFFICE VISIT (OUTPATIENT)
Dept: FAMILY MEDICINE CLINIC | Facility: CLINIC | Age: 68
End: 2018-05-18
Payer: COMMERCIAL

## 2018-05-18 VITALS
TEMPERATURE: 97.2 F | SYSTOLIC BLOOD PRESSURE: 126 MMHG | BODY MASS INDEX: 27.7 KG/M2 | WEIGHT: 187 LBS | RESPIRATION RATE: 18 BRPM | DIASTOLIC BLOOD PRESSURE: 80 MMHG | HEIGHT: 69 IN | HEART RATE: 78 BPM

## 2018-05-18 DIAGNOSIS — Z11.59 NEED FOR HEPATITIS C SCREENING TEST: ICD-10-CM

## 2018-05-18 DIAGNOSIS — E78.2 MIXED HYPERLIPIDEMIA: ICD-10-CM

## 2018-05-18 DIAGNOSIS — Z23 NEED FOR VACCINATION: ICD-10-CM

## 2018-05-18 DIAGNOSIS — L57.0 ACTINIC KERATOSES: ICD-10-CM

## 2018-05-18 DIAGNOSIS — K21.9 GASTROESOPHAGEAL REFLUX DISEASE, ESOPHAGITIS PRESENCE NOT SPECIFIED: ICD-10-CM

## 2018-05-18 DIAGNOSIS — Z00.00 WELL ADULT EXAM: Primary | ICD-10-CM

## 2018-05-18 DIAGNOSIS — K21.9 GERD WITHOUT ESOPHAGITIS: ICD-10-CM

## 2018-05-18 PROBLEM — Z12.5 SCREENING FOR PROSTATE CANCER: Status: RESOLVED | Noted: 2018-04-24 | Resolved: 2018-05-18

## 2018-05-18 PROBLEM — Z13.6 SCREENING FOR CARDIOVASCULAR CONDITION: Status: RESOLVED | Noted: 2018-04-24 | Resolved: 2018-05-18

## 2018-05-18 PROCEDURE — G0438 PPPS, INITIAL VISIT: HCPCS | Performed by: FAMILY MEDICINE

## 2018-05-18 PROCEDURE — 90714 TD VACC NO PRESV 7 YRS+ IM: CPT

## 2018-05-18 PROCEDURE — 90471 IMMUNIZATION ADMIN: CPT

## 2018-05-18 RX ORDER — COVID-19 ANTIGEN TEST
KIT MISCELLANEOUS 2 TIMES DAILY PRN
COMMUNITY
End: 2018-05-18

## 2018-05-18 RX ORDER — ATORVASTATIN CALCIUM 20 MG/1
20 TABLET, FILM COATED ORAL DAILY
Qty: 90 TABLET | Refills: 1 | Status: SHIPPED | OUTPATIENT
Start: 2018-05-18 | End: 2018-07-19 | Stop reason: SDUPTHER

## 2018-05-18 RX ORDER — OMEPRAZOLE 20 MG/1
20 CAPSULE, DELAYED RELEASE ORAL DAILY
Start: 2018-05-18 | End: 2018-07-19 | Stop reason: SDUPTHER

## 2018-05-18 RX ORDER — CHLORAL HYDRATE 500 MG
1 CAPSULE ORAL 3 TIMES DAILY
COMMUNITY
End: 2018-05-18 | Stop reason: SDUPTHER

## 2018-05-18 RX ORDER — LORATADINE 10 MG/1
1 TABLET ORAL
COMMUNITY
End: 2018-07-19 | Stop reason: SDUPTHER

## 2018-05-18 RX ORDER — CHLORAL HYDRATE 500 MG
1000 CAPSULE ORAL 3 TIMES DAILY
Refills: 0
Start: 2018-05-18 | End: 2018-07-19 | Stop reason: SDUPTHER

## 2018-05-18 NOTE — PROGRESS NOTES
FAMILY PRACTICE HEALTH MAINTENANCE OFFICE VISIT  [facility]    NAME: Finesse Keene  AGE: 79 y o  SEX: male  : 1950     DATE: 2018    Assessment and Plan     Problem List Items Addressed This Visit     GERD without esophagitis    Relevant Medications    omeprazole (PriLOSEC) 20 mg delayed release capsule    Mixed hyperlipidemia    Relevant Medications    atorvastatin (LIPITOR) 20 mg tablet    Omega-3 Fatty Acids (FISH OIL) 1,000 mg    Other Relevant Orders    Comprehensive metabolic panel    Lipid Panel with Direct LDL reflex      Other Visit Diagnoses     Well adult exam    -  Primary    Need for hepatitis C screening test        Relevant Orders    Hepatitis C antibody    Need for vaccination        Relevant Orders    TD VACCINE GREATER THAN OR EQUAL TO 6YO PRESERVATIVE FREE IM    Gastroesophageal reflux disease, esophagitis presence not specified        Relevant Medications    omeprazole (PriLOSEC) 20 mg delayed release capsule    Actinic keratoses        Relevant Orders    Ambulatory referral to Dermatology            · Patient Counseling:   · Nutrition: Stressed importance of a well balanced diet, moderation of sodium/saturated fat, caloric balance and sufficient intake of fiber  · Exercise: Stressed the importance of regular exercise with a goal of 150 minutes per week  · Dental Health: Discussed daily flossing and brushing and regular dental visits   · Sexuality: Discussed sexually transmitted infections, use of condoms and prevention of unintended pregnancy  · Alcohol Use:  Recommended moderation of alcohol intake  · Injury Prevention: Discussed Safety Belts, Safety Helmets, and Smoke Detectors    · Immunizations reviewed  Yes   · Discussed benefits of screening yes  · Discussed the patient's BMI with him  The BMI is in the acceptable range     No Follow-up on file          Chief Complaint     Chief Complaint   Patient presents with    Physical Exam [id number]       History of Present Illness     Pt is here for a full physical    Pt states the ins no longer covers his PPI - pt switched to otc meds for gerd - he has been taking the 20 mg otc and it is working    Pt states he has figity feet at night states he saw podiatry and nothing was found put on gabapentin, did not help        Well Adult Physical   Patient here for a comprehensive physical exam       Diet and Physical Activity  Diet: well balanced diet  Weight concerns: Patient is overweight (BMI 25 0-29  9)  Exercise: frequently      Depression Screen  PHQ-9 Depression Screening    PHQ-9:    Frequency of the following problems over the past two weeks:       Little interest or pleasure in doing things:  0 - not at all  Feeling down, depressed, or hopeless:  0 - not at all  PHQ-2 Score:  0          General Health  Hearing: Normal:  bilateral  Vision: wears glasses  Dental: regular dental visits    Reproductive Health        The following portions of the patient's history were reviewed and updated as appropriate: allergies, current medications, past family history, past medical history, past social history, past surgical history and problem list     Review of Systems     Review of Systems   Constitutional: Negative for activity change, appetite change, chills, diaphoresis, fatigue, fever and unexpected weight change  HENT: Negative for congestion, dental problem, ear pain, mouth sores, sinus pain, sinus pressure, sore throat and trouble swallowing  Eyes: Negative for photophobia, discharge and itching  Respiratory: Negative for apnea, chest tightness and shortness of breath  Cardiovascular: Negative for chest pain, palpitations and leg swelling  Gastrointestinal: Negative for abdominal distention, abdominal pain, blood in stool, nausea and vomiting  Endocrine: Negative for cold intolerance, heat intolerance, polydipsia, polyphagia and polyuria  Genitourinary: Negative for difficulty urinating  Musculoskeletal: Negative for arthralgias  Skin: Positive for rash  Negative for color change and wound  Neurological: Negative for dizziness, syncope, speech difficulty and headaches  Hematological: Negative for adenopathy  Psychiatric/Behavioral: Negative for agitation and behavioral problems         Past Medical History     Past Medical History:   Diagnosis Date    Acquired ankle/foot deformity     last assessed: 8/19/16    Difficulty walking     last assessed: 8/19/16    Pes planus of right foot     last assessed: 7/7/16    PPD+ (purified protein derivative positive)        Past Surgical History     Past Surgical History:   Procedure Laterality Date    CORONARY ANGIOPLASTY      OTHER SURGICAL HISTORY      Scrotum excision of lesion    SKIN LESION EXCISION         Social History     Social History     Social History    Marital status: /Civil Union     Spouse name: N/A    Number of children: N/A    Years of education: N/A     Social History Main Topics    Smoking status: Former Smoker    Smokeless tobacco: Never Used    Alcohol use Yes      Comment: social    Drug use: No    Sexual activity: Not Asked     Other Topics Concern    None     Social History Narrative    None       Family History     Family History   Problem Relation Age of Onset    Stroke Mother     Tuberculosis Mother     Throat cancer Mother     Other Mother      tobacco abuse    Angina Father      at rest    Emphysema Father      COPD    Other Father      tobacco abuse    Mental illness Neg Hx        Current Medications       Current Outpatient Prescriptions:     atorvastatin (LIPITOR) 20 mg tablet, Take 1 tablet (20 mg total) by mouth daily, Disp: 90 tablet, Rfl: 1    Omega-3 Fatty Acids (FISH OIL) 1,000 mg, Take 1 capsule (1,000 mg total) by mouth 3 (three) times a day, Disp: , Rfl: 0    omeprazole (PriLOSEC) 20 mg delayed release capsule, Take 1 capsule (20 mg total) by mouth daily, Disp: , Rfl:    aspirin 81 MG tablet, Take 1 tablet by mouth daily, Disp: , Rfl:     loratadine (CLARITIN) 10 mg tablet, Take 1 tablet by mouth, Disp: , Rfl:     Turmeric 1053 MG TABS, Take by mouth, Disp: , Rfl:      Allergies     No Known Allergies    Objective     /80   Pulse 78   Temp (!) 97 2 °F (36 2 °C)   Resp 18   Ht 5' 8 5" (1 74 m)   Wt 84 8 kg (187 lb)   BMI 28 02 kg/m²      Physical Exam   Constitutional: He appears well-developed and well-nourished  No distress  HENT:   Head: Normocephalic and atraumatic  Right Ear: External ear normal    Left Ear: External ear normal    Nose: Nose normal    Mouth/Throat: Oropharynx is clear and moist  No oropharyngeal exudate  Eyes: EOM are normal  Pupils are equal, round, and reactive to light  Right eye exhibits no discharge  Left eye exhibits no discharge  No scleral icterus  Neck: No thyromegaly present  Cardiovascular: Normal rate and normal heart sounds  No murmur heard  Pulmonary/Chest: Effort normal and breath sounds normal  No respiratory distress  He has no wheezes  Abdominal: Soft  Bowel sounds are normal  He exhibits no distension and no mass  There is no tenderness  There is no rebound and no guarding  Genitourinary: Rectum normal and prostate normal  Rectal exam shows guaiac negative stool  Musculoskeletal: Normal range of motion  Neurological: He is alert  He displays normal reflexes  Coordination normal    Skin: Skin is warm and dry  He is not diaphoretic  No erythema  Psychiatric: He has a normal mood and affect  His behavior is normal    Nursing note and vitals reviewed           Visual Acuity Screening    Right eye Left eye Both eyes   Without correction: 20/70 20/30 20/30   With correction:          Health Maintenance     Health Maintenance   Topic Date Due    Hepatitis C Screening  1950    COLONOSCOPY  1950    Depression Screening PHQ-9  02/08/1964    DTaP,Tdap,and Td Vaccines (1 - Tdap) 02/08/1973    Fall Risk  02/08/2017    ABDOMINAL AORTIC ANEURYSM (AAA) SCREEN  02/08/2017    PNEUMOCOCCAL POLYSACCHARIDE VACCINE AGE 72 AND OVER  02/08/2017    INFLUENZA VACCINE  09/01/2018     There is no immunization history for the selected administration types on file for this patient    Recent Results (from the past 168 hour(s))   CBC and differential    Collection Time: 05/15/18  7:22 AM   Result Value Ref Range    WBC 6 50 4 31 - 10 16 Thousand/uL    RBC 4 42 3 88 - 5 62 Million/uL    Hemoglobin 14 5 12 0 - 17 0 g/dL    Hematocrit 43 2 36 5 - 49 3 %    MCV 98 82 - 98 fL    MCH 32 8 26 8 - 34 3 pg    MCHC 33 6 31 4 - 37 4 g/dL    RDW 13 4 11 6 - 15 1 %    MPV 11 7 8 9 - 12 7 fL    Platelets 453 541 - 059 Thousands/uL    nRBC 0 /100 WBCs    Neutrophils Relative 46 43 - 75 %    Immat GRANS % 1 0 - 2 %    Lymphocytes Relative 31 14 - 44 %    Monocytes Relative 15 (H) 4 - 12 %    Eosinophils Relative 7 (H) 0 - 6 %    Basophils Relative 1 0 - 1 %    Neutrophils Absolute 2 99 1 85 - 7 62 Thousands/µL    Immature Grans Absolute 0 04 0 00 - 0 20 Thousand/uL    Lymphocytes Absolute 2 01 0 60 - 4 47 Thousands/µL    Monocytes Absolute 0 95 0 17 - 1 22 Thousand/µL    Eosinophils Absolute 0 47 0 00 - 0 61 Thousand/µL    Basophils Absolute 0 04 0 00 - 0 10 Thousands/µL   Comprehensive metabolic panel    Collection Time: 05/15/18  7:22 AM   Result Value Ref Range    Sodium 141 136 - 145 mmol/L    Potassium 4 2 3 5 - 5 3 mmol/L    Chloride 109 (H) 100 - 108 mmol/L    CO2 27 21 - 32 mmol/L    Anion Gap 5 4 - 13 mmol/L    BUN 22 5 - 25 mg/dL    Creatinine 0 99 0 60 - 1 30 mg/dL    Glucose, Fasting 84 65 - 99 mg/dL    Calcium 9 1 8 3 - 10 1 mg/dL    AST 21 5 - 45 U/L    ALT 28 12 - 78 U/L    Alkaline Phosphatase 57 46 - 116 U/L    Total Protein 7 2 6 4 - 8 2 g/dL    Albumin 3 6 3 5 - 5 0 g/dL    Total Bilirubin 0 75 0 20 - 1 00 mg/dL    eGFR 79 ml/min/1 73sq m   Lipid Panel with Direct LDL reflex    Collection Time: 05/15/18  7:22 AM   Result Value Ref Range    Cholesterol 144 50 - 200 mg/dL    Triglycerides 84 <=150 mg/dL    HDL, Direct 36 (L) 40 - 60 mg/dL    LDL Calculated 91 0 - 100 mg/dL   PSA, ultrasensitive    Collection Time: 05/15/18  7:22 AM   Result Value Ref Range    PSA, Ultrasensitive 2 260 0 000 - 4 000 ng/mL     Pt sent to derm for destruction of skin lesion on forehead and full body check    Kenyetta Vincent, [address]

## 2018-06-07 ENCOUNTER — TELEPHONE (OUTPATIENT)
Dept: FAMILY MEDICINE CLINIC | Facility: CLINIC | Age: 68
End: 2018-06-07

## 2018-06-07 NOTE — TELEPHONE ENCOUNTER
----- Message from Neo Kaur DO sent at 5/18/2018 10:00 AM EDT -----  Pt did a cologuard last year we need for dash

## 2018-07-19 ENCOUNTER — TELEPHONE (OUTPATIENT)
Dept: FAMILY MEDICINE CLINIC | Facility: CLINIC | Age: 68
End: 2018-07-19

## 2018-07-19 DIAGNOSIS — K21.9 GASTROESOPHAGEAL REFLUX DISEASE, ESOPHAGITIS PRESENCE NOT SPECIFIED: ICD-10-CM

## 2018-07-19 DIAGNOSIS — J30.1 SEASONAL ALLERGIC RHINITIS DUE TO POLLEN: Primary | ICD-10-CM

## 2018-07-19 DIAGNOSIS — E78.2 MIXED HYPERLIPIDEMIA: ICD-10-CM

## 2018-07-19 RX ORDER — LORATADINE 10 MG/1
10 TABLET ORAL DAILY
Qty: 90 TABLET | Refills: 1 | Status: SHIPPED | OUTPATIENT
Start: 2018-07-19

## 2018-07-19 RX ORDER — ATORVASTATIN CALCIUM 20 MG/1
20 TABLET, FILM COATED ORAL DAILY
Qty: 90 TABLET | Refills: 1 | Status: SHIPPED | OUTPATIENT
Start: 2018-07-19 | End: 2018-12-28 | Stop reason: SDUPTHER

## 2018-07-19 RX ORDER — CHLORAL HYDRATE 500 MG
1000 CAPSULE ORAL 3 TIMES DAILY
Qty: 270 EACH | Refills: 1 | Status: SHIPPED | OUTPATIENT
Start: 2018-07-19

## 2018-07-19 RX ORDER — OMEPRAZOLE 20 MG/1
20 CAPSULE, DELAYED RELEASE ORAL DAILY
Qty: 90 CAPSULE | Refills: 1 | Status: SHIPPED | OUTPATIENT
Start: 2018-07-19

## 2018-07-19 NOTE — TELEPHONE ENCOUNTER
All mail order medications will go through [facility], except for one Atorvastatin that one will go to [facility] in [facility] in [place] because of insurance , so he needs a new script for the Atorvastatin he has one refill left but he can't use it  So please call this one in to the [facility] in [facility]

## 2018-08-17 DIAGNOSIS — E78.2 MIXED HYPERLIPIDEMIA: ICD-10-CM

## 2018-08-17 RX ORDER — ATORVASTATIN CALCIUM 20 MG/1
TABLET, FILM COATED ORAL
Qty: 90 TABLET | Refills: 1 | Status: SHIPPED | OUTPATIENT
Start: 2018-08-17 | End: 2019-04-03 | Stop reason: SDUPTHER

## 2018-11-13 ENCOUNTER — APPOINTMENT (OUTPATIENT)
Dept: LAB | Facility: CLINIC | Age: 68
End: 2018-11-13
Payer: COMMERCIAL

## 2018-11-13 DIAGNOSIS — E78.2 MIXED HYPERLIPIDEMIA: ICD-10-CM

## 2018-11-13 LAB
ALBUMIN SERPL BCP-MCNC: 3.7 G/DL (ref 3.5–5)
ALP SERPL-CCNC: 51 U/L (ref 46–116)
ALT SERPL W P-5'-P-CCNC: 31 U/L (ref 12–78)
ANION GAP SERPL CALCULATED.3IONS-SCNC: 2 MMOL/L (ref 4–13)
AST SERPL W P-5'-P-CCNC: 19 U/L (ref 5–45)
BILIRUB SERPL-MCNC: 0.87 MG/DL (ref 0.2–1)
BUN SERPL-MCNC: 17 MG/DL (ref 5–25)
CALCIUM SERPL-MCNC: 8.5 MG/DL (ref 8.3–10.1)
CHLORIDE SERPL-SCNC: 105 MMOL/L (ref 100–108)
CHOLEST SERPL-MCNC: 164 MG/DL (ref 50–200)
CO2 SERPL-SCNC: 30 MMOL/L (ref 21–32)
CREAT SERPL-MCNC: 1.01 MG/DL (ref 0.6–1.3)
GFR SERPL CREATININE-BSD FRML MDRD: 77 ML/MIN/1.73SQ M
GLUCOSE P FAST SERPL-MCNC: 89 MG/DL (ref 65–99)
HDLC SERPL-MCNC: 33 MG/DL (ref 40–60)
LDLC SERPL CALC-MCNC: 95 MG/DL (ref 0–100)
POTASSIUM SERPL-SCNC: 4.1 MMOL/L (ref 3.5–5.3)
PROT SERPL-MCNC: 7.3 G/DL (ref 6.4–8.2)
SODIUM SERPL-SCNC: 137 MMOL/L (ref 136–145)
TRIGL SERPL-MCNC: 178 MG/DL

## 2018-11-13 PROCEDURE — 80053 COMPREHEN METABOLIC PANEL: CPT

## 2018-11-13 PROCEDURE — 80061 LIPID PANEL: CPT

## 2018-11-13 PROCEDURE — 86803 HEPATITIS C AB TEST: CPT | Performed by: FAMILY MEDICINE

## 2018-11-13 PROCEDURE — 36415 COLL VENOUS BLD VENIPUNCTURE: CPT

## 2018-11-14 LAB — HCV AB SER QL: NORMAL

## 2018-12-28 DIAGNOSIS — E78.2 MIXED HYPERLIPIDEMIA: ICD-10-CM

## 2018-12-28 RX ORDER — ATORVASTATIN CALCIUM 20 MG/1
20 TABLET, FILM COATED ORAL DAILY
Qty: 90 TABLET | Refills: 0 | Status: SHIPPED | OUTPATIENT
Start: 2018-12-28 | End: 2019-04-03 | Stop reason: SDUPTHER

## 2019-04-03 DIAGNOSIS — E78.2 MIXED HYPERLIPIDEMIA: ICD-10-CM

## 2019-04-03 RX ORDER — ATORVASTATIN CALCIUM 20 MG/1
20 TABLET, FILM COATED ORAL DAILY
Qty: 90 TABLET | Refills: 1 | Status: SHIPPED | OUTPATIENT
Start: 2019-04-03

## 2019-04-03 RX ORDER — ATORVASTATIN CALCIUM 20 MG/1
TABLET, FILM COATED ORAL
Qty: 90 TABLET | Refills: 3 | Status: SHIPPED | OUTPATIENT
Start: 2019-04-03

## 2019-09-06 ENCOUNTER — TRANSCRIBE ORDERS (OUTPATIENT)
Dept: LAB | Facility: CLINIC | Age: 69
End: 2019-09-06

## 2019-09-06 ENCOUNTER — APPOINTMENT (OUTPATIENT)
Dept: LAB | Facility: CLINIC | Age: 69
End: 2019-09-06
Payer: COMMERCIAL

## 2019-09-06 DIAGNOSIS — Z79.1 ENCOUNTER FOR LONG-TERM (CURRENT) USE OF NON-STEROIDAL ANTI-INFLAMMATORIES: Primary | ICD-10-CM

## 2019-09-06 DIAGNOSIS — E78.5 HYPERLIPIDEMIA, UNSPECIFIED HYPERLIPIDEMIA TYPE: ICD-10-CM

## 2019-09-06 DIAGNOSIS — N42.9 DISEASE OF PROSTATE: ICD-10-CM

## 2019-09-06 DIAGNOSIS — R53.83 FATIGUE, UNSPECIFIED TYPE: ICD-10-CM

## 2019-09-06 DIAGNOSIS — Z79.1 ENCOUNTER FOR LONG-TERM (CURRENT) USE OF NON-STEROIDAL ANTI-INFLAMMATORIES: ICD-10-CM

## 2019-09-06 LAB
ALBUMIN SERPL BCP-MCNC: 4 G/DL (ref 3.5–5)
ALP SERPL-CCNC: 59 U/L (ref 46–116)
ALT SERPL W P-5'-P-CCNC: 40 U/L (ref 12–78)
ANION GAP SERPL CALCULATED.3IONS-SCNC: 5 MMOL/L (ref 4–13)
AST SERPL W P-5'-P-CCNC: 24 U/L (ref 5–45)
BILIRUB SERPL-MCNC: 0.7 MG/DL (ref 0.2–1)
BUN SERPL-MCNC: 24 MG/DL (ref 5–25)
CALCIUM SERPL-MCNC: 9.1 MG/DL (ref 8.3–10.1)
CHLORIDE SERPL-SCNC: 106 MMOL/L (ref 100–108)
CHOLEST SERPL-MCNC: 176 MG/DL (ref 50–200)
CO2 SERPL-SCNC: 28 MMOL/L (ref 21–32)
CREAT SERPL-MCNC: 1.05 MG/DL (ref 0.6–1.3)
ERYTHROCYTE [DISTWIDTH] IN BLOOD BY AUTOMATED COUNT: 13.2 % (ref 11.6–15.1)
GFR SERPL CREATININE-BSD FRML MDRD: 73 ML/MIN/1.73SQ M
GLUCOSE P FAST SERPL-MCNC: 92 MG/DL (ref 65–99)
HCT VFR BLD AUTO: 43.7 % (ref 36.5–49.3)
HDLC SERPL-MCNC: 35 MG/DL (ref 40–60)
HGB BLD-MCNC: 14.6 G/DL (ref 12–17)
LDLC SERPL CALC-MCNC: 115 MG/DL (ref 0–100)
MCH RBC QN AUTO: 32.6 PG (ref 26.8–34.3)
MCHC RBC AUTO-ENTMCNC: 33.4 G/DL (ref 31.4–37.4)
MCV RBC AUTO: 98 FL (ref 82–98)
NONHDLC SERPL-MCNC: 141 MG/DL
PLATELET # BLD AUTO: 189 THOUSANDS/UL (ref 149–390)
PMV BLD AUTO: 11.7 FL (ref 8.9–12.7)
POTASSIUM SERPL-SCNC: 4.2 MMOL/L (ref 3.5–5.3)
PROT SERPL-MCNC: 7.8 G/DL (ref 6.4–8.2)
PSA SERPL-MCNC: 2.4 NG/ML (ref 0–4)
RBC # BLD AUTO: 4.48 MILLION/UL (ref 3.88–5.62)
SODIUM SERPL-SCNC: 139 MMOL/L (ref 136–145)
T4 FREE SERPL-MCNC: 0.78 NG/DL (ref 0.76–1.46)
TRIGL SERPL-MCNC: 132 MG/DL
TSH SERPL DL<=0.05 MIU/L-ACNC: 2.53 UIU/ML (ref 0.36–3.74)
WBC # BLD AUTO: 7.41 THOUSAND/UL (ref 4.31–10.16)

## 2019-09-06 PROCEDURE — 85027 COMPLETE CBC AUTOMATED: CPT

## 2019-09-06 PROCEDURE — 36415 COLL VENOUS BLD VENIPUNCTURE: CPT

## 2019-09-06 PROCEDURE — 80053 COMPREHEN METABOLIC PANEL: CPT

## 2019-09-06 PROCEDURE — 84443 ASSAY THYROID STIM HORMONE: CPT

## 2019-09-06 PROCEDURE — 84439 ASSAY OF FREE THYROXINE: CPT

## 2019-09-06 PROCEDURE — 80061 LIPID PANEL: CPT

## 2019-09-06 PROCEDURE — G0103 PSA SCREENING: HCPCS

## 2019-11-18 ENCOUNTER — TRANSCRIBE ORDERS (OUTPATIENT)
Dept: LAB | Facility: CLINIC | Age: 69
End: 2019-11-18

## 2019-11-18 ENCOUNTER — APPOINTMENT (OUTPATIENT)
Dept: LAB | Facility: CLINIC | Age: 69
End: 2019-11-18
Payer: COMMERCIAL

## 2019-11-18 DIAGNOSIS — R05.9 COUGH: ICD-10-CM

## 2019-11-18 DIAGNOSIS — R05.9 COUGH: Primary | ICD-10-CM

## 2019-11-18 PROCEDURE — 86765 RUBEOLA ANTIBODY: CPT

## 2019-11-18 PROCEDURE — 36415 COLL VENOUS BLD VENIPUNCTURE: CPT

## 2019-11-19 LAB — MEV IGG SER QL: NORMAL

## 2021-03-17 ENCOUNTER — TRANSCRIBE ORDERS (OUTPATIENT)
Dept: LAB | Facility: CLINIC | Age: 71
End: 2021-03-17

## 2021-03-17 ENCOUNTER — APPOINTMENT (OUTPATIENT)
Dept: LAB | Facility: CLINIC | Age: 71
End: 2021-03-17
Payer: COMMERCIAL

## 2021-03-17 DIAGNOSIS — N42.9 DISEASE OF PROSTATE: ICD-10-CM

## 2021-03-17 DIAGNOSIS — E78.5 HYPERLIPIDEMIA, UNSPECIFIED HYPERLIPIDEMIA TYPE: ICD-10-CM

## 2021-03-17 DIAGNOSIS — Z79.1 ENCOUNTER FOR LONG-TERM (CURRENT) USE OF NON-STEROIDAL ANTI-INFLAMMATORIES: Primary | ICD-10-CM

## 2021-03-17 DIAGNOSIS — Z79.1 ENCOUNTER FOR LONG-TERM (CURRENT) USE OF NON-STEROIDAL ANTI-INFLAMMATORIES: ICD-10-CM

## 2021-03-17 LAB
ALBUMIN SERPL BCP-MCNC: 3.9 G/DL (ref 3.5–5)
ALP SERPL-CCNC: 60 U/L (ref 46–116)
ALT SERPL W P-5'-P-CCNC: 36 U/L (ref 12–78)
ANION GAP SERPL CALCULATED.3IONS-SCNC: 3 MMOL/L (ref 4–13)
AST SERPL W P-5'-P-CCNC: 16 U/L (ref 5–45)
BILIRUB SERPL-MCNC: 0.53 MG/DL (ref 0.2–1)
BUN SERPL-MCNC: 25 MG/DL (ref 5–25)
CALCIUM SERPL-MCNC: 8.9 MG/DL (ref 8.3–10.1)
CHLORIDE SERPL-SCNC: 111 MMOL/L (ref 100–108)
CHOLEST SERPL-MCNC: 200 MG/DL (ref 50–200)
CO2 SERPL-SCNC: 28 MMOL/L (ref 21–32)
CREAT SERPL-MCNC: 1.18 MG/DL (ref 0.6–1.3)
ERYTHROCYTE [DISTWIDTH] IN BLOOD BY AUTOMATED COUNT: 13.2 % (ref 11.6–15.1)
GFR SERPL CREATININE-BSD FRML MDRD: 63 ML/MIN/1.73SQ M
GLUCOSE P FAST SERPL-MCNC: 85 MG/DL (ref 65–99)
HCT VFR BLD AUTO: 44.7 % (ref 36.5–49.3)
HDLC SERPL-MCNC: 37 MG/DL
HGB BLD-MCNC: 14.8 G/DL (ref 12–17)
LDLC SERPL CALC-MCNC: 119 MG/DL (ref 0–100)
MCH RBC QN AUTO: 33.1 PG (ref 26.8–34.3)
MCHC RBC AUTO-ENTMCNC: 33.1 G/DL (ref 31.4–37.4)
MCV RBC AUTO: 100 FL (ref 82–98)
NONHDLC SERPL-MCNC: 163 MG/DL
PLATELET # BLD AUTO: 184 THOUSANDS/UL (ref 149–390)
PMV BLD AUTO: 11.6 FL (ref 8.9–12.7)
POTASSIUM SERPL-SCNC: 4.2 MMOL/L (ref 3.5–5.3)
PROT SERPL-MCNC: 7.4 G/DL (ref 6.4–8.2)
PSA SERPL-MCNC: 3.2 NG/ML (ref 0–4)
RBC # BLD AUTO: 4.47 MILLION/UL (ref 3.88–5.62)
SODIUM SERPL-SCNC: 142 MMOL/L (ref 136–145)
TRIGL SERPL-MCNC: 219 MG/DL
WBC # BLD AUTO: 7.1 THOUSAND/UL (ref 4.31–10.16)

## 2021-03-17 PROCEDURE — G0103 PSA SCREENING: HCPCS

## 2021-03-17 PROCEDURE — 85027 COMPLETE CBC AUTOMATED: CPT

## 2021-03-17 PROCEDURE — 80061 LIPID PANEL: CPT

## 2021-03-17 PROCEDURE — 36415 COLL VENOUS BLD VENIPUNCTURE: CPT

## 2021-03-17 PROCEDURE — 80053 COMPREHEN METABOLIC PANEL: CPT

## 2021-03-22 ENCOUNTER — TELEPHONE (OUTPATIENT)
Dept: FAMILY MEDICINE CLINIC | Facility: CLINIC | Age: 71
End: 2021-03-22

## 2021-03-22 NOTE — TELEPHONE ENCOUNTER
Patient has not been seen since 2018  He is due for a CPE  Possible AWV(if he has medicare now)  LMOM for a call back     Trae KEREN Broussard

## 2021-03-26 NOTE — TELEPHONE ENCOUNTER
Spoke to patient, he said he has a new PCP and will no longer continue care with Dr Jenaro Arroyo , please remove pcp     Trae KEREN Broussard

## 2021-03-30 NOTE — TELEPHONE ENCOUNTER
03/29/21 11:25 PM     Thank you for your request  Your request has been received, reviewed, and the patient chart updated  The PCP has successfully been removed with a patient attribution note  This message will now be completed      Thank you  Rosario Quinn